# Patient Record
Sex: MALE | Race: WHITE | Employment: OTHER | URBAN - METROPOLITAN AREA
[De-identification: names, ages, dates, MRNs, and addresses within clinical notes are randomized per-mention and may not be internally consistent; named-entity substitution may affect disease eponyms.]

---

## 2017-02-02 ENCOUNTER — OFFICE VISIT (OUTPATIENT)
Dept: AUDIOLOGY | Facility: CLINIC | Age: 67
End: 2017-02-02

## 2017-02-02 DIAGNOSIS — H90.3 SENSORINEURAL HEARING LOSS, BILATERAL: Primary | ICD-10-CM

## 2017-02-02 PROCEDURE — 92593 HEARING AID CHECK, BOTH EARS: CPT | Performed by: AUDIOLOGIST

## 2017-02-03 NOTE — PROGRESS NOTES
HEARING AID FOLLOW-UP    Betsy Lynch  5/5/1950  [id number]      Pt was seen due to increased difficulty hearing/understanding speech and television. The right aid was intermittent. The left aid had no microphone cover.   The retrieval cords were no

## 2017-02-13 ENCOUNTER — TELEPHONE (OUTPATIENT)
Dept: AUDIOLOGY | Facility: CLINIC | Age: 67
End: 2017-02-13

## 2018-12-14 ENCOUNTER — OFFICE VISIT (OUTPATIENT)
Dept: AUDIOLOGY | Facility: CLINIC | Age: 68
End: 2018-12-14
Payer: MEDICARE

## 2018-12-14 DIAGNOSIS — H90.3 SENSORINEURAL HEARING LOSS, BILATERAL: Primary | ICD-10-CM

## 2018-12-14 PROCEDURE — 92593 HEARING AID CHECK, BOTH EARS: CPT | Performed by: AUDIOLOGIST

## 2018-12-14 NOTE — PROGRESS NOTES
HEARING AID FOLLOW-UP    Gladys Velazquez  5/5/1950  [id number]    Patient is here for follow-up. The patient has the following concerns:  Left hearing aid is not working. Hearing test was completed.   Thresholds and speech findings were stable compar

## 2018-12-21 ENCOUNTER — TELEPHONE (OUTPATIENT)
Dept: AUDIOLOGY | Facility: CLINIC | Age: 68
End: 2018-12-21

## 2018-12-24 ENCOUNTER — AUDIOLOGY DOCUMENTATION (OUTPATIENT)
Dept: AUDIOLOGY | Facility: CLINIC | Age: 68
End: 2018-12-24

## 2018-12-24 NOTE — PROGRESS NOTES
Yamileth Watters is a 77year old male   5/5/1950  [id number]    Pt's left hearing aid ([id number]) is back from warranty check.  replaced the , tube, electronics, and housing.   Cleaned and tested device and reset to user settin

## 2019-02-12 ENCOUNTER — AUDIOLOGY DOCUMENTATION (OUTPATIENT)
Dept: AUDIOLOGY | Facility: CLINIC | Age: 69
End: 2019-02-12

## 2020-04-03 ENCOUNTER — TELEPHONE (OUTPATIENT)
Dept: AUDIOLOGY | Facility: CLINIC | Age: 70
End: 2020-04-03

## 2020-04-03 NOTE — TELEPHONE ENCOUNTER
Pt states the transmitter wire on his HA is broken - asking if he can drop it off and wait for it in his car to be fixed

## 2020-04-03 NOTE — TELEPHONE ENCOUNTER
Spoke with patient. He will bring drop off his hearing aid on Monday 4/6/2020 to be check out. He thinks the  wire is broken.     Cristi Silva.

## 2020-04-06 NOTE — TELEPHONE ENCOUNTER
Pt states he will not be coming in today, states he will await until covid-19 situation calms down, wanted office to be aware.

## 2020-04-06 NOTE — TELEPHONE ENCOUNTER
Patient called to let us know he will not be dropping off his hearing aid for repair at this time. He would like to wait until the Covid 19 risk is lessened.       Deja Perdue, AuD

## 2020-04-30 ENCOUNTER — TELEPHONE (OUTPATIENT)
Dept: AUDIOLOGY | Facility: CLINIC | Age: 70
End: 2020-04-30

## 2020-04-30 NOTE — TELEPHONE ENCOUNTER
Pt called stating pt's right hearing aid is not working. Transmitter wire. Pt asking if pt can come to the office and wait until fixed. Pt lives over [place] minutes away.   Please call to advise

## 2020-04-30 NOTE — TELEPHONE ENCOUNTER
Spoke with patient. He will be coming in to office to have a  wire replaced on on of his hearing aids while he waits. Informed him cost of $100.      Dorothy Gregg

## 2020-04-30 NOTE — TELEPHONE ENCOUNTER
Patient came in with right hearing aid with broken wire. Replaced  (right 1xS with retention line and medium open dome). Will enter charges and bill for $100. (/waiting room was full today).         Briefly discussed age of hearing a

## 2021-01-20 ENCOUNTER — TELEPHONE (OUTPATIENT)
Dept: RHEUMATOLOGY | Facility: CLINIC | Age: 71
End: 2021-01-20

## 2021-02-01 ENCOUNTER — OFFICE VISIT (OUTPATIENT)
Dept: RHEUMATOLOGY | Facility: CLINIC | Age: 71
End: 2021-02-01
Payer: MEDICARE

## 2021-02-01 ENCOUNTER — HOSPITAL ENCOUNTER (OUTPATIENT)
Dept: GENERAL RADIOLOGY | Facility: HOSPITAL | Age: 71
Discharge: HOME | End: 2021-02-01
Attending: INTERNAL MEDICINE
Payer: MEDICARE

## 2021-02-01 ENCOUNTER — LAB ENCOUNTER (OUTPATIENT)
Dept: LAB | Facility: HOSPITAL | Age: 71
End: 2021-02-01
Attending: INTERNAL MEDICINE
Payer: MEDICARE

## 2021-02-01 VITALS
WEIGHT: 160.5 LBS | DIASTOLIC BLOOD PRESSURE: 97 MMHG | SYSTOLIC BLOOD PRESSURE: 201 MMHG | HEART RATE: 56 BPM | HEIGHT: 68 IN | BODY MASS INDEX: 24.32 KG/M2

## 2021-02-01 DIAGNOSIS — M79.644 FINGER PAIN, RIGHT: ICD-10-CM

## 2021-02-01 DIAGNOSIS — M35.3 PMR (POLYMYALGIA RHEUMATICA) (HCC): Primary | ICD-10-CM

## 2021-02-01 DIAGNOSIS — M10.072 ACUTE IDIOPATHIC GOUT INVOLVING TOE OF LEFT FOOT: ICD-10-CM

## 2021-02-01 DIAGNOSIS — M35.3 PMR (POLYMYALGIA RHEUMATICA) (HCC): ICD-10-CM

## 2021-02-01 LAB
CRP SERPL-MCNC: 0.6 MG/DL (ref ?–0.3)
ERYTHROCYTE [SEDIMENTATION RATE] IN BLOOD: 7 MM/HR

## 2021-02-01 PROCEDURE — 85652 RBC SED RATE AUTOMATED: CPT

## 2021-02-01 PROCEDURE — 73130 X-RAY EXAM OF HAND: CPT | Performed by: INTERNAL MEDICINE

## 2021-02-01 PROCEDURE — 36415 COLL VENOUS BLD VENIPUNCTURE: CPT

## 2021-02-01 PROCEDURE — 99204 OFFICE O/P NEW MOD 45 MIN: CPT | Performed by: INTERNAL MEDICINE

## 2021-02-01 PROCEDURE — 86140 C-REACTIVE PROTEIN: CPT

## 2021-02-01 RX ORDER — SILDENAFIL CITRATE 20 MG/1
1 TABLET ORAL AS NEEDED
COMMUNITY
Start: 2021-01-05

## 2021-02-01 RX ORDER — PREDNISONE 1 MG/1
TABLET ORAL
Qty: 100 TABLET | Refills: 3 | Status: SHIPPED | OUTPATIENT
Start: 2021-02-01 | End: 2021-05-03

## 2021-02-01 RX ORDER — TORSEMIDE 5 MG/1
2.5 TABLET ORAL EVERY OTHER DAY
COMMUNITY
Start: 2020-12-16

## 2021-02-01 RX ORDER — PREDNISONE 1 MG/1
TABLET ORAL
Qty: 90 TABLET | Refills: 1 | Status: SHIPPED | OUTPATIENT
Start: 2021-02-01 | End: 2021-05-03

## 2021-02-01 RX ORDER — METFORMIN HYDROCHLORIDE 500 MG/1
2 TABLET, EXTENDED RELEASE ORAL 2 TIMES DAILY
COMMUNITY
Start: 2021-01-18

## 2021-02-01 RX ORDER — AMLODIPINE BESYLATE 5 MG/1
5 TABLET ORAL DAILY
COMMUNITY
Start: 2020-12-12

## 2021-02-01 RX ORDER — PREDNISONE 1 MG/1
10 TABLET ORAL DAILY
COMMUNITY
Start: 2021-01-21 | End: 2021-05-03

## 2021-02-01 NOTE — PROGRESS NOTES
Dear Dr. Robin Goncalves:    I saw your patient Teodoro Mo again in my rheumatology clinic. I had last seen him in May of 2013, with PMR. In 2013 his C-reactive protein was 10.3, sed rate 39, rheumatoid factor and CCP antibody negative.   His platelets were e really cut back on his beer consumption after his probable gout attack months ago. He swims daily.     Review of systems:  No fevers, chills, sweats, anorexia, weight loss, rash, L abnormal alopecia, internal eye inflammation, oral or nasal ulcers, of good think he needs to be on methotrexate or other disease modifying drug. 3.  Acid reflux, hypertension, diabetes, high cholesterol, doing well. His blood pressure is high today, but he thinks is blue coat syndrome.     I would like to see him back in 3 mon

## 2021-05-03 ENCOUNTER — OFFICE VISIT (OUTPATIENT)
Dept: RHEUMATOLOGY | Facility: CLINIC | Age: 71
End: 2021-05-03
Payer: MEDICARE

## 2021-05-03 VITALS
HEART RATE: 67 BPM | WEIGHT: 155 LBS | HEIGHT: 68 IN | BODY MASS INDEX: 23.49 KG/M2 | SYSTOLIC BLOOD PRESSURE: 170 MMHG | DIASTOLIC BLOOD PRESSURE: 89 MMHG

## 2021-05-03 DIAGNOSIS — M35.3 PMR (POLYMYALGIA RHEUMATICA) (HCC): Primary | ICD-10-CM

## 2021-05-03 PROCEDURE — 99213 OFFICE O/P EST LOW 20 MIN: CPT | Performed by: INTERNAL MEDICINE

## 2021-05-03 RX ORDER — PREDNISONE 1 MG/1
TABLET ORAL
Qty: 70 TABLET | Refills: 0 | Status: SHIPPED | OUTPATIENT
Start: 2021-05-03 | End: 2021-08-06

## 2021-05-03 NOTE — PROGRESS NOTES
HPI/Subjective:   Patient ID: Lauren Almodovar is a 65year old male. Maurisio Katz is a 77-year-old gentleman I saw again for Dr. Baker Diver February 1st of 2021 after having had a flare of PMR starting in October of 2020.   It originally went into remission off round, and reactive to light. Cardiovascular:      Rate and Rhythm: Normal rate and regular rhythm. Pulses: Normal pulses. Heart sounds: Normal heart sounds.    Pulmonary:      Effort: Pulmonary effort is normal.      Breath sounds: Normal breat

## 2021-08-02 NOTE — PROGRESS NOTES
Adair Martinez is a 53-year-old gentleman I saw again for Dr. Jason Peres February 1st of 2021, after having had a flare of PMR starting in October of 2020. He had PMR years ago, and it was in remission off prednisone in 2014.      He was doing well again on pr Abdominal:      General: Bowel sounds are normal.      Tenderness: There is no abdominal tenderness. Musculoskeletal:      Comments: His shoulders and hips move freely without pain. No inflammation in his wrists or hands.   Minimal swelling at the base

## 2021-08-06 ENCOUNTER — OFFICE VISIT (OUTPATIENT)
Dept: RHEUMATOLOGY | Facility: CLINIC | Age: 71
End: 2021-08-06
Payer: MEDICARE

## 2021-08-06 VITALS
SYSTOLIC BLOOD PRESSURE: 174 MMHG | BODY MASS INDEX: 23.95 KG/M2 | WEIGHT: 158 LBS | HEIGHT: 68 IN | DIASTOLIC BLOOD PRESSURE: 83 MMHG | HEART RATE: 61 BPM

## 2021-08-06 DIAGNOSIS — M10.072 ACUTE IDIOPATHIC GOUT INVOLVING TOE OF LEFT FOOT: ICD-10-CM

## 2021-08-06 DIAGNOSIS — M35.3 PMR (POLYMYALGIA RHEUMATICA) (HCC): Primary | ICD-10-CM

## 2021-08-06 PROCEDURE — 99213 OFFICE O/P EST LOW 20 MIN: CPT | Performed by: INTERNAL MEDICINE

## 2021-08-06 RX ORDER — AMLODIPINE BESYLATE 10 MG/1
10 TABLET ORAL DAILY
COMMUNITY
Start: 2021-06-01

## 2021-08-06 RX ORDER — COLCHICINE 0.6 MG/1
TABLET ORAL
Qty: 15 TABLET | Refills: 0 | Status: SHIPPED | OUTPATIENT
Start: 2021-08-06

## 2022-07-18 NOTE — TELEPHONE ENCOUNTER
Pt called to check status on pt's hearing aid that was sent out for repairs last week.   Please call to advise
no

## 2022-07-19 ENCOUNTER — AUDIOLOGY DOCUMENTATION (OUTPATIENT)
Dept: AUDIOLOGY | Facility: CLINIC | Age: 72
End: 2022-07-19

## 2022-07-19 DIAGNOSIS — H90.3 SENSORINEURAL HEARING LOSS, BILATERAL: Primary | ICD-10-CM

## 2022-07-19 NOTE — PROGRESS NOTES
Hearing aid Information:   Jorje Omalley Smart III  RIght #[id number]  Left #[id number]  Coupled to medium open domes with retention lines  Date dispensed:  1-6-12    Patient dropped off both aids with broken wire on . Replaced  and listening check of right aid is still very noisy/staticy. CLeaned battery compartment which does show rust/corrosion but not improvement. Replaced left  and listening check is okay, but still some circuit noise is audible. Discussed options with patient. Mey does not support repair of this product any longer (older than 10 years). Discussed that getting new will be a better option than spending money on repair of aids this old. He does believe he has insurance benefit of $1250 per hearing aid and has paper to support this from them. He may also have coverage under [employer] and will look into it. Will request a benefit verification as well. Scheduled appt for patient on Aug 2 at 4pm and will proceed from there.

## 2022-08-01 ENCOUNTER — TELEPHONE (OUTPATIENT)
Dept: AUDIOLOGY | Facility: CLINIC | Age: 72
End: 2022-08-01

## 2022-08-01 NOTE — TELEPHONE ENCOUNTER
Spoke to patient- he wants to look into his benefit through [facility] a little more for now He doesn't want to move forward with hearing aids nor with repair until he finds out a little more information about his benefit. He will contact us once he is ready to move forward or make a decision about a repair.  He is aware that appt for 8/2/2022 needs to be cancelled

## 2022-08-11 ENCOUNTER — AUDIOLOGY DOCUMENTATION (OUTPATIENT)
Dept: AUDIOLOGY | Facility: CLINIC | Age: 72
End: 2022-08-11

## 2024-03-19 NOTE — PAT NURSING NOTE
Spoke to patient today regarding procedure scheduled 3/25. This RN reviewed the patient's current medication list.  Patient is prescribed sildenafil. Per anesthesia protocol, patient is to hold these medications 72 hours prior to procedure. Patient verbalized understanding.

## 2024-03-25 ENCOUNTER — HOSPITAL ENCOUNTER (OUTPATIENT)
Facility: HOSPITAL | Age: 74
Setting detail: HOSPITAL OUTPATIENT SURGERY
Discharge: HOME | End: 2024-03-25
Attending: INTERNAL MEDICINE | Admitting: INTERNAL MEDICINE
Payer: MEDICARE

## 2024-03-25 VITALS
SYSTOLIC BLOOD PRESSURE: 105 MMHG | OXYGEN SATURATION: 95 % | DIASTOLIC BLOOD PRESSURE: 70 MMHG | BODY MASS INDEX: 23.52 KG/M2 | WEIGHT: 157 LBS | RESPIRATION RATE: 10 BRPM | HEIGHT: 68.5 IN | HEART RATE: 55 BPM

## 2024-03-25 DIAGNOSIS — Z12.11 COLON CANCER SCREENING: ICD-10-CM

## 2024-03-25 LAB — GLUCOSE BLDC GLUCOMTR-MCNC: 127 MG/DL (ref 70–99)

## 2024-03-25 PROCEDURE — 0DBL8ZZ EXCISION OF TRANSVERSE COLON, VIA NATURAL OR ARTIFICIAL OPENING ENDOSCOPIC: ICD-10-PCS | Performed by: INTERNAL MEDICINE

## 2024-03-25 PROCEDURE — 82962 GLUCOSE BLOOD TEST: CPT

## 2024-03-25 PROCEDURE — 88305 TISSUE EXAM BY PATHOLOGIST: CPT | Performed by: INTERNAL MEDICINE

## 2024-03-25 PROCEDURE — 99152 MOD SED SAME PHYS/QHP 5/>YRS: CPT | Performed by: INTERNAL MEDICINE

## 2024-03-25 RX ORDER — MIDAZOLAM HYDROCHLORIDE 1 MG/ML
1 INJECTION INTRAMUSCULAR; INTRAVENOUS EVERY 5 MIN PRN
Status: DISCONTINUED | OUTPATIENT
Start: 2024-03-25 | End: 2024-03-25

## 2024-03-25 RX ORDER — SODIUM CHLORIDE, SODIUM LACTATE, POTASSIUM CHLORIDE, CALCIUM CHLORIDE 600; 310; 30; 20 MG/100ML; MG/100ML; MG/100ML; MG/100ML
INJECTION, SOLUTION INTRAVENOUS CONTINUOUS
Status: DISCONTINUED | OUTPATIENT
Start: 2024-03-25 | End: 2024-03-25

## 2024-03-25 RX ORDER — SODIUM CHLORIDE 0.9 % (FLUSH) 0.9 %
10 SYRINGE (ML) INJECTION AS NEEDED
Status: DISCONTINUED | OUTPATIENT
Start: 2024-03-25 | End: 2024-03-25

## 2024-03-25 RX ORDER — MIDAZOLAM HYDROCHLORIDE 1 MG/ML
INJECTION INTRAMUSCULAR; INTRAVENOUS
Status: DISCONTINUED | OUTPATIENT
Start: 2024-03-25 | End: 2024-03-25

## 2024-03-25 RX ORDER — LOSARTAN POTASSIUM 25 MG/1
25 TABLET ORAL DAILY
COMMUNITY
Start: 2024-03-20

## 2024-03-25 RX ORDER — ATROPINE SULFATE 1 MG/ML
INJECTION, SOLUTION INTRAMUSCULAR; INTRAVENOUS; SUBCUTANEOUS
Status: DISCONTINUED | OUTPATIENT
Start: 2024-03-25 | End: 2024-03-25

## 2024-03-25 NOTE — OPERATIVE REPORT
COLONOSCOPY REPORT    Patient Name:  Nico Kumar  Medical Record #: [id number]  YOB: 1950  Date of Procedure: 3/25/2024    Referring physician: Eze Garcia MD    Surgeon:  Nathan Lindquist MD    Pre-op diagnosis: Colon cancer screening  (Last colonoscopy 10 years ago)  Post-op diagnosis: Diverticulosis and polyp    Medications given:  Versed- 5 mg ivp,  Fentanyl- 100 mcg ivp.  Total moderate sedation time was 19 minutes. A trained sedation nurse was present to assist in monitoring the patient during the entire length of moderate sedation time.  Atropine 0.5 mg IV push for intraprocedural bradycardia.    Procedure:    After informed consent, discussion of risks and alternatives the patient was placed in the left lateral decubitus position and the high definition colonoscope was introduced into the rectum and advanced under direct visualization to the ileum.  Bowel preparation was excellent.  The mucosa was carefully inspected upon withdrawal of the scope.  Withdrawal time was 7 minutes.  ASA class was 2.  The [facility] computer system would not allow saving or printing of photographic images.    Findings:   The ileal mucosa was normal. .  A 4 mm hepatic flexure polyp was cold snare excised.  There is extensive diverticulosis throughout the left colon.  There was no evidence of ulcerations, colitis, vascular anomalies or mass lesions.  Digital rectal examination revealed internal hemorrhoids.    Plan:  High fiber diet  Await biopsy results    Specimens: polyp  EBL: minimal    Aronchick bowel prep scale:  5 Inadequate (repeat preparation needed)  4 Poor (semisolid stool could not be suctioned and <90% of mucosa seen)  3 Fair (semisolid stool could not be suctioned, but >90% of mucosa seen)  2 Good (clear liquid covering up to 25% of mucosa, but >90% of mucosa seen)  1 Excellent (>95% of mucosa seen)

## 2024-03-25 NOTE — DISCHARGE INSTRUCTIONS
ENDOSCOPY DISCHARGE INSTRUCTIONS    Procedure Performed:   Colonoscopy and Polypectomy    Endoscopist: No name on file  FINDINGS:   Internal/external hemorrhoids, Diverticulosis (pockets in colon that develop with age and lack of fiber intake), and Colon polyp(s) (a growth in the colon)    MEDICATIONS:  You may resume all other medications today    DIET:  High fiber/low fat diet    BIOPSIES:  Biopsy results will be released to you as soon as they are available as is now the law. This will not allow your physician the opportunity to go over these before they are released to you. It is not necessary to contact the office for explanation of these results. Your physician will contact you within a few business days of their release to review the findings with you    X-RAYS/LABS:   No X-rays/Labs were ordered today    ADDITIONAL RECOMMENDATIONS:        Activity for remainder of today:    REST TODAY  DO NOT drive or operate heavy machinery  DO NOT drink any alcoholic beverages  DO NOT sign any legal documents or make any important decisions    After your procedure(s):  It is not unusual to feel bloated or gassy .  Passing gas and belching is encouraged. Lying on your left side with your knees flexed may relieve the discomfort. A hot pack to the abdomen may also help.    After your gastroscopy (upper endoscopy): You may experience a slight sore throat which will subside. Throat lozenges or salt water gargle can be used.    FOLLOW-UP:  Contact the office at [phone] for follow-up appointment is needed or if you develop any of the following:    Severe abdominal pain/discomfort     Excessive bleeding                     Black tarry stool    Difficulty breathing/swallowing      Persistent nausea/vomiting  Fever above 100 degrees or chills            Home Care Instructions for Colonoscopy  Diet:  - Resume your regular diet as tolerated unless otherwise instructed.  - Start with light meals to minimize bloating.  - Do not  drink alcohol today.    Medication:  - If you have questions about resuming your normal medications, please contact your Primary Care Physician.    Activities:  - Take it easy today. Do not return to work today.  - Do not drive today.  - Do not operate any machinery today (including kitchen equipment).    Colonoscopy:  - You may notice some rectal \"spotting\" (a little blood on the toilet tissue) for a day or two after the exam. This is normal.  - If you experience any rectal bleeding (not spotting), persistent tenderness or sharp severe abdominal pains, oral temperature over 100 degrees Fahrenheit, light-headedness or dizziness, or any other problems, contact your doctor.      **If unable to reach your doctor, please go to the [facility] Emergency Room**    - Your referring physician will receive a full report of your examination.  - If you do not hear from your doctor's office within two weeks of your biopsy, please call them for your results.    You may be able to see your laboratory results in [email or web address] between 4 and 7 business days.  In some cases, your physician may not have viewed the results before they are released to [employer].  If you have questions regarding your results contact the physician who ordered the test/exam by phone or via [email or web address] by choosing \"Ask a Medical Question.\"

## 2024-03-25 NOTE — H&P
RE-PROCEDURE UPDATE    HPI: Nico Kumar is a 73 year old male. 5/5/1950.    Patient presents for a colonoscopy.    ALLERGIES: No Known Allergies    No current outpatient medications on file.     Past Medical History:   Diagnosis Date    Diabetes (HCC)     H/O hernia repair     Hearing impairment     High blood pressure     High cholesterol     Rheumatic fever     Visual impairment      Past Surgical History:   Procedure Laterality Date    HERNIA SURGERY Bilateral 2008       PHYSICAL EXAM:  /85 (BP Location: Left arm)   Pulse 66   Ht 5' 8.5\" (1.74 m)   Wt 157 lb (71.2 kg)   SpO2 97%   BMI 23.52 kg/m²   LUNGS:   Clear to auscultation.  CARDIAC:   RRR, normal S1, S2; no S3 or murmur appreciated.  ABDOMEN:   Bowel sounds normoactive. Soft, no organomegaly or masses appreciated. Nontender.    IMPRESSION: Screening      PLAN:  No significant changes except as outlined above.     Colonoscopy with possible biopsy/polypectomy--the planned endoscopic procedure, indications, risks, benefits and post-op precautions were discussed and questions were answered in the pre-operative area.     Nathan Lindquist MD

## 2024-03-25 NOTE — PROGRESS NOTES
Patient bradycardic to 20's BP 80/40's during colonoscopy, procedure paused, atropine given per MD order IV, heart rate back to preop 55BPM bp 117/77 post atropine

## (undated) DEVICE — KIT CLEAN ENDOKIT 1.1OZ GOWNX2

## (undated) DEVICE — KIT ENDO ORCAPOD 160/180/190

## (undated) DEVICE — MEDI-VAC NON-CONDUCTIVE SUCTION TUBING 6MM X 1.8M (6FT.) L: Brand: CARDINAL HEALTH

## (undated) DEVICE — 60 ML SYRINGE REGULAR TIP: Brand: MONOJECT

## (undated) DEVICE — Device: Brand: DUAL NARE NASAL CANNULAE FEMALE LUER CON 7FT O2 TUBE

## (undated) DEVICE — LASSO POLYPECTOMY SNARE: Brand: LASSO

## (undated) DEVICE — STERILE LATEX POWDER-FREE SURGICAL GLOVESWITH NITRILE COATING: Brand: PROTEXIS

## (undated) DEVICE — SNARE OPTMZ PLPCTM TRP

## (undated) NOTE — MR AVS SNAPSHOT
[place]  [address]  [phone]               [facility] you [facility]   We are glad to serve you and happy to provide you with this summar Your unique [employer] Access Code: [email or web address]  Expires: 4/4/2017  8:16 AM    If you have questions, you can call [phone] to talk to our [facility] Staff. Remember, Snap[employer]iz is NOT to be used for urgent needs. For medical emergencies, dial 911.

## (undated) NOTE — LETTER
[facility]  [address], [place], [place]  Authorization for Surgical Operation and Procedure                                                                                           I hereby authorize Nathan Lindquist MD, my physician and his/her assistants (if applicable), which may include medical students, residents, and/or fellows, to perform the following surgical operation/ procedure and administer such anesthesia as may be determined necessary by my physician: Operation/Procedure name (s) COLONOSCOPY on Nico Kumar   2.   I recognize that during the surgical operation/procedure, unforeseen conditions may necessitate additional or different procedures than those listed above.  I, therefore, further authorize and request that the above-named surgeon, assistants, or designees perform such procedures as are, in their judgment, necessary and desirable.    3.   My surgeon/physician has discussed prior to my surgery the potential benefits, risks and side effects of this procedure; the likelihood of achieving goals; and potential problems that might occur during recuperation.  They also discussed reasonable alternatives to the procedure, including risks, benefits, and side effects related to the alternatives and risks related to not receiving this procedure.  I have had all my questions answered and I acknowledge that no guarantee has been made as to the result that may be obtained.    4.   Should the need arise during my operation/procedure, which includes change of level of care prior to discharge, I also consent to the administration of blood and/or blood products.  Further, I understand that despite careful testing and screening of blood or blood products by collecting agencies, I may still be subject to ill effects as a result of receiving a blood transfusion and/or blood products.  The following are some, but not all, of the potential risks that can occur: fever and allergic  reactions, hemolytic reactions, transmission of diseases such as Hepatitis, AIDS and Cytomegalovirus (CMV) and fluid overload.  In the event that I wish to have an autologous transfusion of my own blood, or a directed donor transfusion, I will discuss this with my physician.  Check only if Refusing Blood or Blood Products  I understand refusal of blood or blood products as deemed necessary by my physician may have serious consequences to my condition to include possible death. I hereby assume responsibility for my refusal and release the hospital, its personnel, and my physicians from any responsibility for the consequences of my refusal.    o  Refuse   5.   I authorize the use of any specimen, organs, tissues, body parts or foreign objects that may be removed from my body during the operation/procedure for diagnosis, research or teaching purposes and their subsequent disposal by hospital authorities.  I also authorize the release of specimen test results and/or written reports to my treating physician on the hospital medical staff or other referring or consulting physicians involved in my care, at the discretion of the Pathologist or my treating physician.    6.   I consent to the photographing or videotaping of the operations or procedures to be performed, including appropriate portions of my body for medical, scientific, or educational purposes, provided my identity is not revealed by the pictures or by descriptive texts accompanying them.  If the procedure has been photographed/videotaped, the surgeon will obtain the original picture, image, videotape or CD.  The hospital will not be responsible for storage, release or maintenance of the picture, image, tape or CD.    7.   I consent to the presence of a  or observers in the operating room as deemed necessary by my physician or their designees.    8.   I recognize that in the event my procedure results in extended X-Ray/fluoroscopy time, I may  develop a skin reaction.    9. If I have a Do Not Attempt Resuscitation (DNAR) order in place, that status will be suspended while in the operating room, procedural suite, and during the recovery period unless otherwise explicitly stated by me (or a person authorized to consent on my behalf). The surgeon or my attending physician will determine when the applicable recovery period ends for purposes of reinstating the DNAR order.  10. Patients having a sterilization procedure: I understand that if the procedure is successful the results will be permanent and it will therefore be impossible for me to inseminate, conceive, or bear children.  I also understand that the procedure is intended to result in sterility, although the result has not been guaranteed.   11. I acknowledge that my physician has explained sedation/analgesia administration to me including the risk and benefits I consent to the administration of sedation/analgesia as may be necessary or desirable in the judgment of my physician.    I CERTIFY THAT I HAVE READ AND FULLY UNDERSTAND THE ABOVE CONSENT TO OPERATION and/or OTHER PROCEDURE.     _________________________________________ _________________________________     ___________________________________  Signature of Patient     Signature of Responsible Person                   Printed Name of Responsible Person                              _________________________________________ ______________________________        ___________________________________  Signature of Witness         Date  Time         Relationship to Patient    STATEMENT OF PHYSICIAN My signature below affirms that prior to the time of the procedure; I have explained to the patient and/or his/her legal representative, the risks and benefits involved in the proposed treatment and any reasonable alternative to the proposed treatment. I have also explained the risks and benefits involved in refusal of the proposed treatment and alternatives  to the proposed treatment and have answered the patient's questions. If I have a significant financial interest in a co-management agreement or a significant financial interest in any product or implant, or other significant relationship used in this procedure/surgery, I have disclosed this and had a discussion with my patient.     _______________________________________________________________ _____________________________  (Signature of Physician)                                                                                         (Date)                                   (Time)  Patient Name: Nico KEITA Stacy    : 1950   Printed: 3/20/2024      Medical Record #: [id number]                                              Page 1 of 1